# Patient Record
Sex: FEMALE | Race: BLACK OR AFRICAN AMERICAN | NOT HISPANIC OR LATINO
[De-identification: names, ages, dates, MRNs, and addresses within clinical notes are randomized per-mention and may not be internally consistent; named-entity substitution may affect disease eponyms.]

---

## 2017-02-04 PROBLEM — Z01.419 WOMEN'S ANNUAL ROUTINE GYNECOLOGICAL EXAMINATION: Status: ACTIVE | Noted: 2017-02-04

## 2017-02-06 PROCEDURE — 88175 CYTOPATH C/V AUTO FLUID REDO: CPT | Performed by: OBSTETRICS & GYNECOLOGY

## 2018-05-05 ENCOUNTER — HOSPITAL (OUTPATIENT)
Dept: OTHER | Age: 53
End: 2018-05-05

## 2018-05-05 ENCOUNTER — IMAGING SERVICES (OUTPATIENT)
Dept: OTHER | Age: 53
End: 2018-05-05

## 2020-03-29 ENCOUNTER — HOSPITAL (OUTPATIENT)
Dept: OTHER | Age: 55
End: 2020-03-29

## 2020-03-29 LAB
ANALYZER ANC (IANC): ABNORMAL
ANION GAP SERPL CALC-SCNC: 8 MMOL/L (ref 10–20)
BASOPHILS # BLD: 0 K/MCL (ref 0–0.3)
BASOPHILS NFR BLD: 0 %
BUN SERPL-MCNC: 15 MG/DL (ref 6–20)
BUN/CREAT SERPL: 17 (ref 7–25)
CALCIUM SERPL-MCNC: 8.2 MG/DL (ref 8.4–10.2)
CHLORIDE SERPL-SCNC: 105 MMOL/L (ref 98–107)
CO2 SERPL-SCNC: 30 MMOL/L (ref 21–32)
CREAT SERPL-MCNC: 0.89 MG/DL (ref 0.51–0.95)
DIFFERENTIAL METHOD BLD: ABNORMAL
EOSINOPHIL # BLD: 0 K/MCL (ref 0.1–0.5)
EOSINOPHIL NFR BLD: 0 %
ERYTHROCYTE [DISTWIDTH] IN BLOOD: 13.8 % (ref 11–15)
GLUCOSE SERPL-MCNC: 171 MG/DL (ref 65–99)
HCT VFR BLD CALC: 42.4 % (ref 36–46.5)
HGB BLD-MCNC: 13.4 G/DL (ref 12–15.5)
IMM GRANULOCYTES # BLD AUTO: 0 K/MCL (ref 0–0.2)
IMM GRANULOCYTES NFR BLD: 1 %
LYMPHOCYTES # BLD: 2.3 K/MCL (ref 1–4)
LYMPHOCYTES NFR BLD: 53 %
MCH RBC QN AUTO: 27.5 PG (ref 26–34)
MCHC RBC AUTO-ENTMCNC: 31.6 G/DL (ref 32–36.5)
MCV RBC AUTO: 86.9 FL (ref 78–100)
MONOCYTES # BLD: 0.3 K/MCL (ref 0.3–0.9)
MONOCYTES NFR BLD: 7 %
NEUTROPHILS # BLD: 1.7 K/MCL (ref 1.8–7.7)
NEUTROPHILS NFR BLD: 39 %
NEUTS SEG NFR BLD: ABNORMAL %
NRBC (NRBCRE): 0 /100 WBC
PLATELET # BLD: 168 K/MCL (ref 140–450)
POTASSIUM SERPL-SCNC: 3.2 MMOL/L (ref 3.4–5.1)
RBC # BLD: 4.88 MIL/MCL (ref 4–5.2)
SODIUM SERPL-SCNC: 140 MMOL/L (ref 135–145)
WBC # BLD: 4.3 K/MCL (ref 4.2–11)

## 2020-03-29 PROCEDURE — 99284 EMERGENCY DEPT VISIT MOD MDM: CPT | Performed by: EMERGENCY MEDICINE

## 2021-10-22 ENCOUNTER — IMMUNIZATION (OUTPATIENT)
Dept: URGENT CARE | Age: 56
End: 2021-10-22

## 2021-10-22 DIAGNOSIS — Z23 INFLUENZA VACCINE ADMINISTERED: Primary | ICD-10-CM

## 2021-10-22 PROCEDURE — 90687 IIV4 VACCINE SPLT 0.25 ML IM: CPT | Performed by: NURSE PRACTITIONER

## 2021-10-22 PROCEDURE — 90471 IMMUNIZATION ADMIN: CPT | Performed by: NURSE PRACTITIONER

## 2022-03-17 ENCOUNTER — TELEPHONE (OUTPATIENT)
Dept: SCHEDULING | Age: 57
End: 2022-03-17

## 2022-04-27 ENCOUNTER — TELEPHONE (OUTPATIENT)
Dept: SCHEDULING | Age: 57
End: 2022-04-27

## 2022-04-28 ENCOUNTER — TELEPHONE (OUTPATIENT)
Dept: SCHEDULING | Age: 57
End: 2022-04-28

## 2022-05-02 ENCOUNTER — TELEPHONE (OUTPATIENT)
Dept: SCHEDULING | Age: 57
End: 2022-05-02

## 2022-05-02 ENCOUNTER — OFFICE VISIT (OUTPATIENT)
Dept: FAMILY MEDICINE | Age: 57
End: 2022-05-02

## 2022-05-02 VITALS
WEIGHT: 224.98 LBS | HEIGHT: 64 IN | OXYGEN SATURATION: 97 % | BODY MASS INDEX: 38.41 KG/M2 | HEART RATE: 109 BPM | TEMPERATURE: 96.7 F | DIASTOLIC BLOOD PRESSURE: 90 MMHG | SYSTOLIC BLOOD PRESSURE: 160 MMHG

## 2022-05-02 DIAGNOSIS — Z13.5 SCREENING FOR DIABETIC RETINOPATHY: ICD-10-CM

## 2022-05-02 DIAGNOSIS — I10 HTN (HYPERTENSION), BENIGN: ICD-10-CM

## 2022-05-02 DIAGNOSIS — E11.65 UNCONTROLLED TYPE 2 DIABETES MELLITUS WITH HYPERGLYCEMIA (CMD): ICD-10-CM

## 2022-05-02 DIAGNOSIS — D22.9 BENIGN MOLE: ICD-10-CM

## 2022-05-02 DIAGNOSIS — E11.65 UNCONTROLLED TYPE 2 DIABETES MELLITUS WITH HYPERGLYCEMIA (CMD): Primary | ICD-10-CM

## 2022-05-02 DIAGNOSIS — Z00.00 ANNUAL PHYSICAL EXAM: ICD-10-CM

## 2022-05-02 PROCEDURE — 3077F SYST BP >= 140 MM HG: CPT | Performed by: STUDENT IN AN ORGANIZED HEALTH CARE EDUCATION/TRAINING PROGRAM

## 2022-05-02 PROCEDURE — 99214 OFFICE O/P EST MOD 30 MIN: CPT | Performed by: STUDENT IN AN ORGANIZED HEALTH CARE EDUCATION/TRAINING PROGRAM

## 2022-05-02 RX ORDER — LISINOPRIL 2.5 MG/1
2.5 TABLET ORAL DAILY
Qty: 30 TABLET | Refills: 1 | Status: SHIPPED | OUTPATIENT
Start: 2022-05-02 | End: 2022-05-02

## 2022-05-02 RX ORDER — VITAMIN B COMPLEX
TABLET ORAL
COMMUNITY
Start: 2021-04-25

## 2022-05-02 RX ORDER — LISINOPRIL 2.5 MG/1
2.5 TABLET ORAL DAILY
Qty: 90 TABLET | Refills: 0 | Status: SHIPPED | OUTPATIENT
Start: 2022-05-02

## 2022-05-02 ASSESSMENT — PATIENT HEALTH QUESTIONNAIRE - PHQ9
SUM OF ALL RESPONSES TO PHQ9 QUESTIONS 1 AND 2: 0
2. FEELING DOWN, DEPRESSED OR HOPELESS: NOT AT ALL
CLINICAL INTERPRETATION OF PHQ2 SCORE: NO FURTHER SCREENING NEEDED
1. LITTLE INTEREST OR PLEASURE IN DOING THINGS: NOT AT ALL
SUM OF ALL RESPONSES TO PHQ9 QUESTIONS 1 AND 2: 0

## 2022-05-02 ASSESSMENT — ENCOUNTER SYMPTOMS
SHORTNESS OF BREATH: 0
BACK PAIN: 0
ABDOMINAL PAIN: 0
COUGH: 0
HEADACHES: 0
FEVER: 0
FATIGUE: 0
COLOR CHANGE: 0
WHEEZING: 0
ABDOMINAL DISTENTION: 0
PSYCHIATRIC NEGATIVE: 1
DIZZINESS: 0

## 2022-05-03 ENCOUNTER — LAB SERVICES (OUTPATIENT)
Dept: LAB | Age: 57
End: 2022-05-03

## 2022-05-03 ENCOUNTER — TELEPHONE (OUTPATIENT)
Dept: FAMILY MEDICINE | Age: 57
End: 2022-05-03

## 2022-05-03 DIAGNOSIS — Z00.00 ANNUAL PHYSICAL EXAM: ICD-10-CM

## 2022-05-03 DIAGNOSIS — E11.65 UNCONTROLLED TYPE 2 DIABETES MELLITUS WITH HYPERGLYCEMIA (CMD): ICD-10-CM

## 2022-05-03 LAB
25(OH)D3+25(OH)D2 SERPL-MCNC: 14.3 NG/ML (ref 30–100)
ALBUMIN SERPL-MCNC: 3.7 G/DL (ref 3.6–5.1)
ALBUMIN/GLOB SERPL: 1 {RATIO} (ref 1–2.4)
ALP SERPL-CCNC: 89 UNITS/L (ref 45–117)
ALT SERPL-CCNC: 22 UNITS/L
ANION GAP SERPL CALC-SCNC: 10 MMOL/L (ref 10–20)
AST SERPL-CCNC: 15 UNITS/L
BILIRUB SERPL-MCNC: 0.6 MG/DL (ref 0.2–1)
BUN SERPL-MCNC: 10 MG/DL (ref 6–20)
BUN/CREAT SERPL: 14 (ref 7–25)
CALCIUM SERPL-MCNC: 9.1 MG/DL (ref 8.4–10.2)
CHLORIDE SERPL-SCNC: 108 MMOL/L (ref 98–107)
CHOLEST SERPL-MCNC: 215 MG/DL
CHOLEST/HDLC SERPL: 4.4 {RATIO}
CO2 SERPL-SCNC: 30 MMOL/L (ref 21–32)
CREAT SERPL-MCNC: 0.71 MG/DL (ref 0.51–0.95)
CREAT UR-MCNC: 110 MG/DL
DEPRECATED RDW RBC: 43.5 FL (ref 39–50)
ERYTHROCYTE [DISTWIDTH] IN BLOOD: 13.2 % (ref 11–15)
FASTING DURATION TIME PATIENT: 12 HOURS (ref 0–999)
FASTING DURATION TIME PATIENT: 12 HOURS (ref 0–999)
GFR SERPLBLD BASED ON 1.73 SQ M-ARVRAT: >90 ML/MIN
GLOBULIN SER-MCNC: 3.6 G/DL (ref 2–4)
GLUCOSE SERPL-MCNC: 199 MG/DL (ref 70–99)
HBA1C MFR BLD: 8.7 % (ref 4.5–5.6)
HCT VFR BLD CALC: 42.6 % (ref 36–46.5)
HDLC SERPL-MCNC: 49 MG/DL
HGB BLD-MCNC: 13.5 G/DL (ref 12–15.5)
LDLC SERPL CALC-MCNC: 137 MG/DL
MCH RBC QN AUTO: 28.6 PG (ref 26–34)
MCHC RBC AUTO-ENTMCNC: 31.7 G/DL (ref 32–36.5)
MCV RBC AUTO: 90.3 FL (ref 78–100)
MICROALBUMIN UR-MCNC: 24.1 MG/DL
MICROALBUMIN/CREAT UR: 219.1 MG/G
NONHDLC SERPL-MCNC: 166 MG/DL
NRBC BLD MANUAL-RTO: 0 /100 WBC
PLATELET # BLD AUTO: 260 K/MCL (ref 140–450)
POTASSIUM SERPL-SCNC: 3.7 MMOL/L (ref 3.4–5.1)
PROT SERPL-MCNC: 7.3 G/DL (ref 6.4–8.2)
RBC # BLD: 4.72 MIL/MCL (ref 4–5.2)
SODIUM SERPL-SCNC: 144 MMOL/L (ref 135–145)
TRIGL SERPL-MCNC: 143 MG/DL
WBC # BLD: 4.3 K/MCL (ref 4.2–11)

## 2022-05-03 PROCEDURE — 83036 HEMOGLOBIN GLYCOSYLATED A1C: CPT | Performed by: INTERNAL MEDICINE

## 2022-05-03 PROCEDURE — 82570 ASSAY OF URINE CREATININE: CPT | Performed by: INTERNAL MEDICINE

## 2022-05-03 PROCEDURE — 82306 VITAMIN D 25 HYDROXY: CPT | Performed by: INTERNAL MEDICINE

## 2022-05-03 PROCEDURE — 85027 COMPLETE CBC AUTOMATED: CPT | Performed by: INTERNAL MEDICINE

## 2022-05-03 PROCEDURE — 82043 UR ALBUMIN QUANTITATIVE: CPT | Performed by: INTERNAL MEDICINE

## 2022-05-03 PROCEDURE — 80061 LIPID PANEL: CPT | Performed by: INTERNAL MEDICINE

## 2022-05-03 PROCEDURE — 36415 COLL VENOUS BLD VENIPUNCTURE: CPT | Performed by: INTERNAL MEDICINE

## 2022-05-03 PROCEDURE — 80053 COMPREHEN METABOLIC PANEL: CPT | Performed by: INTERNAL MEDICINE

## 2022-05-05 ENCOUNTER — NURSE TRIAGE (OUTPATIENT)
Dept: SCHEDULING | Age: 57
End: 2022-05-05

## 2022-05-05 ENCOUNTER — TELEPHONE (OUTPATIENT)
Dept: SCHEDULING | Age: 57
End: 2022-05-05

## 2022-05-05 DIAGNOSIS — E11.65 DIABETES MELLITUS TYPE 2 WITH HYPEROSMOLARITY, UNCONTROLLED (CMD): Primary | ICD-10-CM

## 2022-05-05 DIAGNOSIS — E11.00 DIABETES MELLITUS TYPE 2 WITH HYPEROSMOLARITY, UNCONTROLLED (CMD): Primary | ICD-10-CM

## 2022-05-05 RX ORDER — LANCETS 28 GAUGE
EACH MISCELLANEOUS
Qty: 100 EACH | Refills: 3 | Status: SHIPPED | OUTPATIENT
Start: 2022-05-05

## 2022-05-06 ENCOUNTER — TELEPHONE (OUTPATIENT)
Dept: SCHEDULING | Age: 57
End: 2022-05-06

## 2022-07-28 ENCOUNTER — TELEPHONE (OUTPATIENT)
Dept: SCHEDULING | Age: 57
End: 2022-07-28

## 2022-09-20 ENCOUNTER — APPOINTMENT (OUTPATIENT)
Dept: ENDOCRINOLOGY | Age: 57
End: 2022-09-20

## 2022-10-03 ENCOUNTER — OFFICE VISIT (OUTPATIENT)
Dept: INTERNAL MEDICINE CLINIC | Facility: CLINIC | Age: 57
End: 2022-10-03
Payer: COMMERCIAL

## 2022-10-03 VITALS
RESPIRATION RATE: 16 BRPM | BODY MASS INDEX: 38.76 KG/M2 | WEIGHT: 227 LBS | HEART RATE: 80 BPM | SYSTOLIC BLOOD PRESSURE: 142 MMHG | DIASTOLIC BLOOD PRESSURE: 80 MMHG | HEIGHT: 64 IN

## 2022-10-03 DIAGNOSIS — Z51.81 ENCOUNTER FOR THERAPEUTIC DRUG MONITORING: Primary | ICD-10-CM

## 2022-10-03 DIAGNOSIS — E55.9 VITAMIN D DEFICIENCY: ICD-10-CM

## 2022-10-03 DIAGNOSIS — E66.01 CLASS 2 SEVERE OBESITY WITH SERIOUS COMORBIDITY AND BODY MASS INDEX (BMI) OF 38.0 TO 38.9 IN ADULT, UNSPECIFIED OBESITY TYPE (HCC): ICD-10-CM

## 2022-10-03 DIAGNOSIS — E11.9 TYPE 2 DIABETES MELLITUS WITHOUT COMPLICATION, WITHOUT LONG-TERM CURRENT USE OF INSULIN (HCC): ICD-10-CM

## 2022-10-03 DIAGNOSIS — E78.2 MIXED HYPERLIPIDEMIA: ICD-10-CM

## 2022-10-03 DIAGNOSIS — I10 PRIMARY HYPERTENSION: ICD-10-CM

## 2022-10-03 PROCEDURE — 3079F DIAST BP 80-89 MM HG: CPT | Performed by: PHYSICIAN ASSISTANT

## 2022-10-03 PROCEDURE — 3008F BODY MASS INDEX DOCD: CPT | Performed by: PHYSICIAN ASSISTANT

## 2022-10-03 PROCEDURE — 3077F SYST BP >= 140 MM HG: CPT | Performed by: PHYSICIAN ASSISTANT

## 2022-10-03 PROCEDURE — 99204 OFFICE O/P NEW MOD 45 MIN: CPT | Performed by: PHYSICIAN ASSISTANT

## 2022-10-03 RX ORDER — FLASH GLUCOSE SCANNING READER
1 EACH MISCELLANEOUS AS DIRECTED
COMMUNITY
Start: 2022-05-06

## 2022-10-03 RX ORDER — TIRZEPATIDE 2.5 MG/.5ML
2.5 INJECTION, SOLUTION SUBCUTANEOUS WEEKLY
Qty: 2 ML | Refills: 0 | Status: SHIPPED | OUTPATIENT
Start: 2022-10-03

## 2022-10-03 RX ORDER — LISINOPRIL 10 MG/1
10 TABLET ORAL DAILY
Qty: 90 TABLET | Refills: 0 | Status: SHIPPED | OUTPATIENT
Start: 2022-10-03

## 2022-10-03 RX ORDER — FLASH GLUCOSE SENSOR
1 KIT MISCELLANEOUS
COMMUNITY
Start: 2022-05-03

## 2022-10-05 ENCOUNTER — TELEPHONE (OUTPATIENT)
Dept: INTERNAL MEDICINE CLINIC | Facility: CLINIC | Age: 57
End: 2022-10-05

## 2022-10-05 NOTE — TELEPHONE ENCOUNTER
Patient left message she is having trouble getting her mounjaro RX at the pharmacy - they do not have in stock.   Sent my chart to try and see if she can find elsewhere

## 2022-10-12 ENCOUNTER — TELEPHONE (OUTPATIENT)
Dept: FAMILY MEDICINE | Age: 57
End: 2022-10-12

## 2022-11-01 ENCOUNTER — APPOINTMENT (OUTPATIENT)
Dept: ENDOCRINOLOGY | Age: 57
End: 2022-11-01

## 2022-11-01 NOTE — TELEPHONE ENCOUNTER
Requesting Mounjaro  LOV: 10/3/22  RTC: 8 weeks  Last Relevant Labs: 5/3/22  Filled: 10/3/22 #2ml with 0 refills Mounjaro 2.5 mg    Future Appointments   Date Time Provider Justine Jones   12/5/2022  2:00 PM [facility]     Patient doing very well with medicine. Her glucose is dropping from 170 to 130 in the mornings. Very happy and ready to move up. Pended.

## 2022-11-04 RX ORDER — TIRZEPATIDE 5 MG/.5ML
5 INJECTION, SOLUTION SUBCUTANEOUS WEEKLY
Qty: 2 ML | Refills: 0 | Status: SHIPPED | OUTPATIENT
Start: 2022-11-04

## 2022-12-05 ENCOUNTER — OFFICE VISIT (OUTPATIENT)
Dept: INTERNAL MEDICINE CLINIC | Facility: CLINIC | Age: 57
End: 2022-12-05
Payer: COMMERCIAL

## 2022-12-05 VITALS
RESPIRATION RATE: 16 BRPM | DIASTOLIC BLOOD PRESSURE: 88 MMHG | HEART RATE: 90 BPM | WEIGHT: 222 LBS | SYSTOLIC BLOOD PRESSURE: 132 MMHG | HEIGHT: 64 IN | BODY MASS INDEX: 37.9 KG/M2

## 2022-12-05 DIAGNOSIS — E66.01 CLASS 2 SEVERE OBESITY WITH SERIOUS COMORBIDITY AND BODY MASS INDEX (BMI) OF 38.0 TO 38.9 IN ADULT, UNSPECIFIED OBESITY TYPE (HCC): ICD-10-CM

## 2022-12-05 DIAGNOSIS — E78.2 MIXED HYPERLIPIDEMIA: ICD-10-CM

## 2022-12-05 DIAGNOSIS — E11.9 TYPE 2 DIABETES MELLITUS WITHOUT COMPLICATION, WITHOUT LONG-TERM CURRENT USE OF INSULIN (HCC): ICD-10-CM

## 2022-12-05 DIAGNOSIS — I10 PRIMARY HYPERTENSION: ICD-10-CM

## 2022-12-05 DIAGNOSIS — Z51.81 ENCOUNTER FOR THERAPEUTIC DRUG MONITORING: Primary | ICD-10-CM

## 2022-12-05 PROCEDURE — 3075F SYST BP GE 130 - 139MM HG: CPT | Performed by: PHYSICIAN ASSISTANT

## 2022-12-05 PROCEDURE — 3008F BODY MASS INDEX DOCD: CPT | Performed by: PHYSICIAN ASSISTANT

## 2022-12-05 PROCEDURE — 3079F DIAST BP 80-89 MM HG: CPT | Performed by: PHYSICIAN ASSISTANT

## 2022-12-05 PROCEDURE — 99214 OFFICE O/P EST MOD 30 MIN: CPT | Performed by: PHYSICIAN ASSISTANT

## 2022-12-05 RX ORDER — TIRZEPATIDE 7.5 MG/.5ML
7.5 INJECTION, SOLUTION SUBCUTANEOUS WEEKLY
Qty: 2 ML | Refills: 0 | Status: SHIPPED | OUTPATIENT
Start: 2022-12-05

## 2022-12-10 RX ORDER — TIRZEPATIDE 5 MG/.5ML
5 INJECTION, SOLUTION SUBCUTANEOUS WEEKLY
Qty: 2 ML | Refills: 0 | Status: CANCELLED | OUTPATIENT
Start: 2022-12-10

## 2022-12-13 ENCOUNTER — TELEPHONE (OUTPATIENT)
Dept: INTERNAL MEDICINE CLINIC | Facility: CLINIC | Age: 57
End: 2022-12-13

## 2022-12-13 RX ORDER — TIRZEPATIDE 5 MG/.5ML
5 INJECTION, SOLUTION SUBCUTANEOUS WEEKLY
Qty: 2 ML | Refills: 0 | Status: SHIPPED | OUTPATIENT
Start: 2022-12-13

## 2022-12-13 NOTE — TELEPHONE ENCOUNTER
Patient left a message she is having trouble obtaining a medicine. She did not state the name of med. She thought she left a message for us? It is likely [facility] - she said she cannot get 0.75 mg dose and wants 0.5 mg  I left her message to call back and verify which med she needs. Emily sent 7.5 mg on 12/5/22 and if she cannot get that, we could send 5 mg - just need to verify with her.

## 2022-12-13 NOTE — TELEPHONE ENCOUNTER
Patient called back and said she would like the 5 mg dose of mounjaro since she cannot get 7.5 mg dose  Order sent now.

## 2022-12-15 RX ORDER — LISINOPRIL 10 MG/1
10 TABLET ORAL DAILY
Qty: 90 TABLET | Refills: 0 | Status: SHIPPED | OUTPATIENT
Start: 2022-12-15

## 2023-01-06 ENCOUNTER — PATIENT MESSAGE (OUTPATIENT)
Dept: INTERNAL MEDICINE CLINIC | Facility: CLINIC | Age: 58
End: 2023-01-06

## 2023-01-06 RX ORDER — TIRZEPATIDE 7.5 MG/.5ML
7.5 INJECTION, SOLUTION SUBCUTANEOUS WEEKLY
Qty: 2 ML | Refills: 0 | Status: CANCELLED | OUTPATIENT
Start: 2023-01-06

## 2023-01-06 RX ORDER — TIRZEPATIDE 10 MG/.5ML
10 INJECTION, SOLUTION SUBCUTANEOUS WEEKLY
Qty: 2 ML | Refills: 0 | Status: SHIPPED | OUTPATIENT
Start: 2023-01-06

## 2023-01-06 NOTE — TELEPHONE ENCOUNTER
I verified with Esperanza Valentin that if patient cannot get 7.5 mg dose she is allowing 10 mg dose and that was sent to pharmacy today.

## 2023-01-06 NOTE — TELEPHONE ENCOUNTER
From: Mckayla Likes  To: Nick Correa PA-C  Sent: 1/6/2023 7:26 AM CST  Subject: Kayleigh Chapman Refills    Good morning Provider Lauren Hernandez and Happy New Year! I continue to have difficulty getting the 7.5mg dosage of Monjarro . Do you have suggestions on how to get me to the increased dosage level? I want to see progression. Thank you and have a great day!

## 2023-01-31 ENCOUNTER — TELEMEDICINE (OUTPATIENT)
Dept: INTERNAL MEDICINE CLINIC | Facility: CLINIC | Age: 58
End: 2023-01-31
Payer: COMMERCIAL

## 2023-01-31 DIAGNOSIS — E78.2 MIXED HYPERLIPIDEMIA: ICD-10-CM

## 2023-01-31 DIAGNOSIS — Z51.81 ENCOUNTER FOR THERAPEUTIC DRUG MONITORING: Primary | ICD-10-CM

## 2023-01-31 DIAGNOSIS — E11.9 TYPE 2 DIABETES MELLITUS WITHOUT COMPLICATION, WITHOUT LONG-TERM CURRENT USE OF INSULIN (HCC): ICD-10-CM

## 2023-01-31 DIAGNOSIS — E66.01 CLASS 2 SEVERE OBESITY WITH SERIOUS COMORBIDITY AND BODY MASS INDEX (BMI) OF 38.0 TO 38.9 IN ADULT, UNSPECIFIED OBESITY TYPE (HCC): ICD-10-CM

## 2023-01-31 DIAGNOSIS — I10 PRIMARY HYPERTENSION: ICD-10-CM

## 2023-01-31 PROCEDURE — 99213 OFFICE O/P EST LOW 20 MIN: CPT | Performed by: PHYSICIAN ASSISTANT

## 2023-02-26 ENCOUNTER — PATIENT MESSAGE (OUTPATIENT)
Dept: INTERNAL MEDICINE CLINIC | Facility: CLINIC | Age: 58
End: 2023-02-26

## 2023-02-26 RX ORDER — TIRZEPATIDE 10 MG/.5ML
10 INJECTION, SOLUTION SUBCUTANEOUS WEEKLY
Qty: 2 ML | Refills: 0 | Status: CANCELLED | OUTPATIENT
Start: 2023-02-26

## 2023-02-26 RX ORDER — TIRZEPATIDE 7.5 MG/.5ML
INJECTION, SOLUTION SUBCUTANEOUS
Qty: 2 ML | Refills: 0 | OUTPATIENT
Start: 2023-02-26

## 2023-02-26 NOTE — TELEPHONE ENCOUNTER
Requesting Mounjaro  LOV: 1/31/23  RTC: not noted  Last Relevant Labs: 5/3/22  Filled: 1/30/23 #2ml with 0 refills  mounjaro 7.5 mg    No future appointments.     Pt left message ready to increase to 10 mg

## 2023-02-27 RX ORDER — TIRZEPATIDE 10 MG/.5ML
10 INJECTION, SOLUTION SUBCUTANEOUS WEEKLY
Qty: 2 ML | Refills: 0 | Status: SHIPPED | OUTPATIENT
Start: 2023-02-27

## 2023-02-27 NOTE — TELEPHONE ENCOUNTER
Requesting mounjaro increase  LOV: 1/31/23  RTC: not noted  Last Relevant Labs: 5/3/22  Filled: 2/27/232 #2ml with 0 refills  Mounjaro 10 mg    No future appointments.

## 2023-02-27 NOTE — TELEPHONE ENCOUNTER
From: Arielle Joaquin  To: Cheikh Hyatt PA-C  Sent: 2/26/2023 12:32 PM CST  Subject: Irena Juárez Refill    Good afternoon Provider Esthela Mcgrath,    I am ready to increase the dosage to the next available for my next refill.

## 2023-05-06 RX ORDER — TIRZEPATIDE 10 MG/.5ML
10 INJECTION, SOLUTION SUBCUTANEOUS WEEKLY
Qty: 2 ML | Refills: 0 | Status: CANCELLED | OUTPATIENT
Start: 2023-05-06

## 2023-05-09 RX ORDER — TIRZEPATIDE 12.5 MG/.5ML
12.5 INJECTION, SOLUTION SUBCUTANEOUS WEEKLY
Qty: 2 ML | Refills: 0 | Status: SHIPPED | OUTPATIENT
Start: 2023-05-09

## 2023-05-11 ENCOUNTER — PATIENT MESSAGE (OUTPATIENT)
Dept: INTERNAL MEDICINE CLINIC | Facility: CLINIC | Age: 58
End: 2023-05-11

## 2023-05-11 RX ORDER — TIRZEPATIDE 10 MG/.5ML
10 INJECTION, SOLUTION SUBCUTANEOUS WEEKLY
Qty: 2 ML | Refills: 0 | OUTPATIENT
Start: 2023-05-11

## 2023-05-16 RX ORDER — TIRZEPATIDE 10 MG/.5ML
10 INJECTION, SOLUTION SUBCUTANEOUS WEEKLY
Qty: 2 ML | Refills: 0 | Status: SHIPPED | OUTPATIENT
Start: 2023-05-16

## 2023-06-07 ENCOUNTER — TELEPHONE (OUTPATIENT)
Dept: INTERNAL MEDICINE CLINIC | Facility: CLINIC | Age: 58
End: 2023-06-07

## 2023-06-07 NOTE — TELEPHONE ENCOUNTER
Pa submitted via [employer] and faxed the chart notes as well  ID [id number]  [facility] [phone] ( fAX)   NO PHONE NUMBER LISTED ON FORM     Type 2 diabetes mellitus without complication, without long-term current use of insulin (HCC)  E11.9    Primary hypertension  I10    Mixed hyperlipidemia  E78.2      Only tried [employer]

## 2023-07-03 RX ORDER — TIRZEPATIDE 10 MG/.5ML
10 INJECTION, SOLUTION SUBCUTANEOUS WEEKLY
Qty: 2 ML | Refills: 0 | Status: SHIPPED | OUTPATIENT
Start: 2023-07-03

## 2023-08-29 DIAGNOSIS — E11.9 TYPE 2 DIABETES MELLITUS WITHOUT COMPLICATION, WITHOUT LONG-TERM CURRENT USE OF INSULIN (HCC): ICD-10-CM

## 2023-08-30 NOTE — TELEPHONE ENCOUNTER
Requesting   Requested Prescriptions     Pending Prescriptions Disp Refills    MOUNJARO 10 MG/0.5ML Subcutaneous Solution Pen-injector [Pharmacy Med Name: Jonathan Fontenot 10MG/0.5ML PF PEN INJ] 2 mL 0     Sig: INJECT 10 MG UNDER THE SKIN ONE DAY A WEEK     LOV: 1/31/23  RTC: not noted  Filled: 8/4/23 #2 with 0 refills    No future appointments.

## 2023-08-31 ENCOUNTER — TELEPHONE (OUTPATIENT)
Dept: INTERNAL MEDICINE CLINIC | Facility: CLINIC | Age: 58
End: 2023-08-31

## 2023-09-06 RX ORDER — TIRZEPATIDE 10 MG/.5ML
10 INJECTION, SOLUTION SUBCUTANEOUS WEEKLY
Qty: 2 ML | Refills: 0 | Status: SHIPPED | OUTPATIENT
Start: 2023-09-06

## 2023-10-13 ENCOUNTER — TELEPHONE (OUTPATIENT)
Dept: INTERNAL MEDICINE CLINIC | Facility: CLINIC | Age: 58
End: 2023-10-13

## 2023-10-13 DIAGNOSIS — E11.9 TYPE 2 DIABETES MELLITUS WITHOUT COMPLICATION, WITHOUT LONG-TERM CURRENT USE OF INSULIN (HCC): Primary | ICD-10-CM

## 2023-10-13 RX ORDER — TIRZEPATIDE 10 MG/.5ML
10 INJECTION, SOLUTION SUBCUTANEOUS WEEKLY
Qty: 2 ML | Refills: 0 | OUTPATIENT
Start: 2023-10-13

## 2023-10-13 RX ORDER — TIRZEPATIDE 10 MG/.5ML
10 INJECTION, SOLUTION SUBCUTANEOUS WEEKLY
Qty: 2 ML | Refills: 2 | Status: SHIPPED | OUTPATIENT
Start: 2023-10-13

## 2023-10-13 RX ORDER — TIRZEPATIDE 10 MG/.5ML
INJECTION, SOLUTION SUBCUTANEOUS
Qty: 2 ML | Refills: 0 | OUTPATIENT
Start: 2023-10-13

## 2023-10-13 NOTE — TELEPHONE ENCOUNTER
Pt was on her way to her appt today and got a call from a student that she was positive for COVID. She cancelled with the front. I reached out to provider. Patient last seen in office in December - video visit in January.   Vani Vallejo said she will refill Mimi salgado for multi fills    Future Appointments   Date Time Provider Justine Jones   10/13/2023 10:00 AM [facility] [address]   2/7/2024 12:20 PM Demetri Fletcher PA-C [address] [facility] [address]

## 2023-10-28 DIAGNOSIS — E11.65 UNCONTROLLED TYPE 2 DIABETES MELLITUS WITH HYPERGLYCEMIA (CMD): ICD-10-CM

## 2023-11-24 DIAGNOSIS — E11.9 TYPE 2 DIABETES MELLITUS WITHOUT COMPLICATION, WITHOUT LONG-TERM CURRENT USE OF INSULIN (HCC): ICD-10-CM

## 2023-11-24 RX ORDER — TIRZEPATIDE 10 MG/.5ML
10 INJECTION, SOLUTION SUBCUTANEOUS WEEKLY
Qty: 2 ML | Refills: 2 | OUTPATIENT
Start: 2023-11-24

## 2023-12-29 DIAGNOSIS — E11.65 UNCONTROLLED TYPE 2 DIABETES MELLITUS WITH HYPERGLYCEMIA (CMD): ICD-10-CM

## 2023-12-29 RX ORDER — ERGOCALCIFEROL 1.25 MG/1
1.25 CAPSULE ORAL
COMMUNITY
Start: 2023-10-02

## 2023-12-29 RX ORDER — TIRZEPATIDE 10 MG/.5ML
INJECTION, SOLUTION SUBCUTANEOUS
COMMUNITY
Start: 2023-12-26

## 2023-12-29 RX ORDER — MELOXICAM 15 MG/1
15 TABLET ORAL DAILY PRN
COMMUNITY
Start: 2023-11-23

## 2024-02-07 ENCOUNTER — OFFICE VISIT (OUTPATIENT)
Dept: INTERNAL MEDICINE CLINIC | Facility: CLINIC | Age: 59
End: 2024-02-07
Payer: COMMERCIAL

## 2024-02-07 DIAGNOSIS — E11.9 TYPE 2 DIABETES MELLITUS WITHOUT COMPLICATION, WITHOUT LONG-TERM CURRENT USE OF INSULIN (HCC): ICD-10-CM

## 2024-02-07 DIAGNOSIS — I10 PRIMARY HYPERTENSION: ICD-10-CM

## 2024-02-07 DIAGNOSIS — Z51.81 ENCOUNTER FOR THERAPEUTIC DRUG MONITORING: Primary | ICD-10-CM

## 2024-02-07 DIAGNOSIS — E66.01 CLASS 2 SEVERE OBESITY WITH SERIOUS COMORBIDITY AND BODY MASS INDEX (BMI) OF 35.0 TO 35.9 IN ADULT, UNSPECIFIED OBESITY TYPE  (HCC): ICD-10-CM

## 2024-02-07 DIAGNOSIS — E78.2 MIXED HYPERLIPIDEMIA: ICD-10-CM

## 2024-02-07 DIAGNOSIS — E55.9 VITAMIN D DEFICIENCY: ICD-10-CM

## 2024-02-07 PROCEDURE — 99214 OFFICE O/P EST MOD 30 MIN: CPT | Performed by: PHYSICIAN ASSISTANT

## 2024-02-07 PROCEDURE — 3079F DIAST BP 80-89 MM HG: CPT | Performed by: PHYSICIAN ASSISTANT

## 2024-02-07 PROCEDURE — 3075F SYST BP GE 130 - 139MM HG: CPT | Performed by: PHYSICIAN ASSISTANT

## 2024-02-07 PROCEDURE — 3008F BODY MASS INDEX DOCD: CPT | Performed by: PHYSICIAN ASSISTANT

## 2024-02-07 RX ORDER — TIRZEPATIDE 12.5 MG/.5ML
12.5 INJECTION, SOLUTION SUBCUTANEOUS WEEKLY
Qty: 6 ML | Refills: 0 | Status: SHIPPED | OUTPATIENT
Start: 2024-02-07

## 2024-02-07 RX ORDER — CHOLECALCIFEROL (VITAMIN D3) 125 MCG
2000 CAPSULE ORAL DAILY
COMMUNITY

## 2024-02-07 NOTE — PROGRESS NOTES
HISTORY OF PRESENT ILLNESS  Chief Complaint   Patient presents with    Weight Check     6lbs (tele)       Darian Leyva is a 58 year old female here for follow up in medical weight loss program.   Last OV was 1/31/23  Compliant on mounjaro  Denies chest pain, shortness of breath, dizziness, blurred vision, headache, paresthesia, nausea/vomiting.   Recent A1C 6.4%  Notices when stressed more snacking, but trying to keep healthier snacks  Off blood pressure medications, has been checking it at home and it has been 128/80    Exercise/Activity: just got a puppy so walking more, tore meniscus and ligament in R knee, no surgery  Nutrition: 24 hour food log reviewed, eating regular meals, +protein, trying to get more fruits and veggies  Stress: 7/10  Sleep: 4 hours/night       Wt Readings from Last 6 Encounters:   02/07/24 208 lb (94.3 kg)   12/05/22 222 lb (100.7 kg)   10/03/22 227 lb (103 kg)   02/04/17 245 lb (111.1 kg)            Breakfast Lunch Dinner Snacks Fluids   Reviewed           REVIEW OF SYSTEMS  GENERAL HEALTH: feels well otherwise, denied any fevers chills or night sweats   RESPIRATORY: denies shortness of breath   CARDIOVASCULAR: denies chest pain  GI: denies abdominal pain    EXAM  /80   Pulse 104   Resp 18   Ht 5' 4\" (1.626 m)   Wt 208 lb (94.3 kg)   SpO2 94%   BMI 35.70 kg/m²   GENERAL: well developed, well nourished,in no apparent distress, A/O x3  SKIN: no rashes,no suspicious lesions  HEENT: atraumatic, normocephalic, OP-clear, PERRL  NECK: supple,no adenopathy  LUNGS: clear to auscultation bilaterally   CARDIO: RRR without murmur  EXTREMITIES: no cyanosis, no clubbing, no edema    No results found for: \"WBC\", \"RBC\", \"HGB\", \"HCT\", \"MCV\", \"MCH\", \"MCHC\", \"RDW\", \"PLT\", \"MPV\"  No results found for: \"GLU\", \"BUN\", \"BUNCREA\", \"CREATSERUM\", \"ANIONGAP\", \"GFR\", \"GFRNAA\", \"GFRAA\", \"CA\", \"OSMOCALC\", \"ALKPHO\", \"AST\", \"ALT\", \"ALKPHOS\", \"BILT\", \"TP\", \"ALB\", \"GLOBULIN\", \"AGRATIO\", \"NA\", \"K\", \"CL\",  \"CO2\"  No results found for: \"EAG\", \"A1C\"  No results found for: \"CHOLEST\", \"TRIG\", \"HDL\", \"LDL\", \"VLDL\", \"TCHDLRATIO\", \"NONHDLC\", \"CHOLHDLRATIO\", \"CALCNONHDL\"  No results found for: \"T4F\", \"TSH\", \"TSHT4\"  No results found for: \"B12\", \"VITB12\"  No results found for: \"VITD\", \"QVITD\", \"FWFD99MS\"    Current Outpatient Medications on File Prior to Visit   Medication Sig Dispense Refill    cholecalciferol 50 MCG (2000 UT) Oral Tab Take 1 tablet (2,000 Units total) by mouth daily.      Tirzepatide (MOUNJARO) 10 MG/0.5ML Subcutaneous Solution Pen-injector Inject 10 mg into the skin once a week. 2 mL 2    Continuous Blood Gluc Sensor (FREESTYLE MARI 2 SENSOR) Does not apply Misc 1 Device every 14 (fourteen) days.       No current facility-administered medications on file prior to visit.       ASSESSMENT  Analyzed weight data:       Diagnoses and all orders for this visit:    Encounter for therapeutic drug monitoring    Class 2 severe obesity with serious comorbidity and body mass index (BMI) of 35.0 to 35.9 in adult, unspecified obesity type  (HCC)    Type 2 diabetes mellitus without complication, without long-term current use of insulin (HCC)    Primary hypertension    Mixed hyperlipidemia    Vitamin D deficiency    Other orders  -     Tirzepatide (MOUNJARO) 12.5 MG/0.5ML Subcutaneous Solution Pen-injector; Inject 12.5 mg into the skin once a week.        PLAN  Initial Weight: 227lbs  Initial Weight Date: 10/3/22  Today's Weight: 208lbs  5% Goal: 11.35lbs  10% Goal: 22.7lbs  Total Weight Loss: Down 6lbs/Net loss 9lbs    Will continue and increase mounjaro - advised side effects and adverse effects of medication   DM - continue current medications, low carb diet  HLD - lifestyle changes  HTN - has been controlling with lifestyle, continue to monitor at home, discussed red flag signs and symptoms and to call 911 or go to the ER.  Consistency with logging foods - protein and produce  Vit D supplement, take with  food  Nutrition: low carb diet/ recommended to eat breakfast daily/ regular protein intake  Medication use and side effects reviewed with patient.  Medication contraindications: stimulant, metformin  Follow up with dietitian and psychologist as recommended.  Discussed the role of sleep and stress in weight management.  Counseled on comprehensive weight loss plan including attention to nutrition, exercise and behavior/stress management for success. See patient instruction below for more details.  Discussed strategies to overcome barriers to successful weight loss and weight maintenance  FITTE: ACSM recommendations (150-300 minutes/ week in active weight loss)   Weight Loss consent to treat reviewed and signed       NOTE TO PATIENT: The 21st Century Cures Act makes clinical notes like these available to patients in the interest of transparency. Clinical notes are medical documents used by physicians and care providers to communicate with each other. These documents include medical language and terminology, abbreviations, and treatment information that may sound technical and at times possibly unfamiliar. In addition, at times, the verbiage may appear blunt or direct. These documents are one tool providers use to communicate relevant information and clinical opinions of the care providers in a way that allows common understanding of the clinical context.     There are no Patient Instructions on file for this visit.    No follow-ups on file.    Patient verbalizes understanding.    Emily Kaufman PA-C  2/7/2024

## 2024-02-11 VITALS
SYSTOLIC BLOOD PRESSURE: 138 MMHG | HEIGHT: 64 IN | WEIGHT: 208 LBS | RESPIRATION RATE: 18 BRPM | OXYGEN SATURATION: 94 % | HEART RATE: 90 BPM | BODY MASS INDEX: 35.51 KG/M2 | DIASTOLIC BLOOD PRESSURE: 88 MMHG

## 2024-05-06 ENCOUNTER — PATIENT MESSAGE (OUTPATIENT)
Facility: CLINIC | Age: 59
End: 2024-05-06

## 2024-05-06 DIAGNOSIS — E11.9 TYPE 2 DIABETES MELLITUS WITHOUT COMPLICATION, WITHOUT LONG-TERM CURRENT USE OF INSULIN (HCC): ICD-10-CM

## 2024-05-06 RX ORDER — TIRZEPATIDE 10 MG/.5ML
10 INJECTION, SOLUTION SUBCUTANEOUS WEEKLY
Qty: 2 ML | Refills: 2 | OUTPATIENT
Start: 2024-05-06

## 2024-05-06 NOTE — TELEPHONE ENCOUNTER
From: Darian Leyva  To: Emily Kaufman  Sent: 5/6/2024 8:11 AM CDT  Subject: Rx refill    Good morning provider Shae     I am unable to get the 12.5 dosage. I had it once and have not been able to get a refill since is it possible to go back to the 10.5 they did have 10 in stock all of a sudden. could you please refill for that the 12.5 is nowhere to be found Thank you

## 2024-05-06 NOTE — TELEPHONE ENCOUNTER
Requesting   Requested Prescriptions     Pending Prescriptions Disp Refills    Tirzepatide (MOUNJARO) 10 MG/0.5ML Subcutaneous Solution Pen-injector 2 mL 2     Sig: Inject 10 mg into the skin once a week.     LOV: 2/7/24  RTC: not noted  Filled: 10/23/23 #2 with 2 refills    No future appointments.    Pt is an 12.5mg

## 2024-05-09 RX ORDER — TIRZEPATIDE 10 MG/.5ML
10 INJECTION, SOLUTION SUBCUTANEOUS WEEKLY
Qty: 2 ML | Refills: 2 | Status: SHIPPED | OUTPATIENT
Start: 2024-05-09

## 2024-05-17 ENCOUNTER — PATIENT MESSAGE (OUTPATIENT)
Facility: CLINIC | Age: 59
End: 2024-05-17

## 2024-05-17 DIAGNOSIS — E11.9 TYPE 2 DIABETES MELLITUS WITHOUT COMPLICATION, WITHOUT LONG-TERM CURRENT USE OF INSULIN (HCC): Primary | ICD-10-CM

## 2024-05-17 RX ORDER — TIRZEPATIDE 12.5 MG/.5ML
12.5 INJECTION, SOLUTION SUBCUTANEOUS WEEKLY
Qty: 6 ML | Refills: 0 | Status: CANCELLED | OUTPATIENT
Start: 2024-05-17

## 2024-05-17 RX ORDER — TIRZEPATIDE 12.5 MG/.5ML
12.5 INJECTION, SOLUTION SUBCUTANEOUS WEEKLY
Qty: 6 ML | Refills: 0 | Status: SHIPPED | OUTPATIENT
Start: 2024-05-17

## 2024-05-17 NOTE — TELEPHONE ENCOUNTER
Patient kept calling nurse line while I was on with insurance company.  I sent her my chart to clarify but she did not read it.  I called when I was done with my current call.  She has been on 10 mg without issue and can move up to 12.5 mg dose and it was ordered previously by Emily.  They have it in stock at [facility] now.  Order sent and patient aware.

## 2024-07-05 DIAGNOSIS — E11.9 TYPE 2 DIABETES MELLITUS WITHOUT COMPLICATION, WITHOUT LONG-TERM CURRENT USE OF INSULIN (HCC): ICD-10-CM

## 2024-07-08 NOTE — TELEPHONE ENCOUNTER
Requesting   Requested Prescriptions     Pending Prescriptions Disp Refills    MOUNJARO 10 MG/0.5ML Subcutaneous Solution Pen-injector [Pharmacy Med Name: MOUNJARO 10MG/0.5ML INJ (4 PENS)] 2 mL 2     Sig: INJECT 10 MG INTO THE SKIN ONCE A WEEK      LOV: 02/07/24  RTC:   Last Relevant Labs:   Filled: 05/09/24 #2ml with 2 refills    No future appointments.

## 2024-09-11 DIAGNOSIS — E11.9 TYPE 2 DIABETES MELLITUS WITHOUT COMPLICATION, WITHOUT LONG-TERM CURRENT USE OF INSULIN (HCC): ICD-10-CM

## 2024-09-16 RX ORDER — TIRZEPATIDE 12.5 MG/.5ML
12.5 INJECTION, SOLUTION SUBCUTANEOUS WEEKLY
Qty: 6 ML | Refills: 0 | Status: SHIPPED | OUTPATIENT
Start: 2024-09-16

## 2024-10-03 RX ORDER — TIRZEPATIDE 10 MG/.5ML
10 INJECTION, SOLUTION SUBCUTANEOUS WEEKLY
Qty: 2 ML | Refills: 2 | OUTPATIENT
Start: 2024-10-03